# Patient Record
Sex: MALE | Race: BLACK OR AFRICAN AMERICAN | NOT HISPANIC OR LATINO | Employment: UNEMPLOYED | URBAN - METROPOLITAN AREA
[De-identification: names, ages, dates, MRNs, and addresses within clinical notes are randomized per-mention and may not be internally consistent; named-entity substitution may affect disease eponyms.]

---

## 2021-07-06 ENCOUNTER — HOSPITAL ENCOUNTER (EMERGENCY)
Facility: HOSPITAL | Age: 56
Discharge: HOME | End: 2021-07-06
Attending: FAMILY MEDICINE
Payer: MEDICAID

## 2021-07-06 VITALS
BODY MASS INDEX: 39.28 KG/M2 | HEIGHT: 69 IN | TEMPERATURE: 98 F | HEART RATE: 101 BPM | RESPIRATION RATE: 18 BRPM | SYSTOLIC BLOOD PRESSURE: 121 MMHG | DIASTOLIC BLOOD PRESSURE: 67 MMHG | WEIGHT: 265.19 LBS | OXYGEN SATURATION: 97 %

## 2021-07-06 DIAGNOSIS — R53.1 WEAKNESS: ICD-10-CM

## 2021-07-06 DIAGNOSIS — Z71.1 WORRIED WELL: Primary | ICD-10-CM

## 2021-07-06 LAB — POCT GLUCOSE: 291 MG/DL (ref 70–110)

## 2021-07-06 PROCEDURE — 93010 EKG 12-LEAD: ICD-10-PCS | Mod: ,,, | Performed by: INTERNAL MEDICINE

## 2021-07-06 PROCEDURE — 93005 ELECTROCARDIOGRAM TRACING: CPT

## 2021-07-06 PROCEDURE — 99283 EMERGENCY DEPT VISIT LOW MDM: CPT | Mod: 25

## 2021-07-06 PROCEDURE — 82962 GLUCOSE BLOOD TEST: CPT

## 2021-07-06 PROCEDURE — 93010 ELECTROCARDIOGRAM REPORT: CPT | Mod: ,,, | Performed by: INTERNAL MEDICINE

## 2021-07-06 RX ORDER — METHOCARBAMOL 500 MG/1
500 TABLET, FILM COATED ORAL NIGHTLY
COMMUNITY
Start: 2021-05-29

## 2021-07-06 RX ORDER — METFORMIN HYDROCHLORIDE 1000 MG/1
1000 TABLET ORAL
COMMUNITY
Start: 2021-04-23

## 2021-07-06 RX ORDER — LEVOTHYROXINE SODIUM 112 UG/1
1 TABLET ORAL EVERY MORNING
COMMUNITY
Start: 2021-04-23

## 2021-07-06 RX ORDER — LISINOPRIL 20 MG/1
20 TABLET ORAL DAILY
COMMUNITY
Start: 2021-06-17

## 2021-07-06 RX ORDER — MULTIVITAMIN
1 TABLET ORAL DAILY
COMMUNITY

## 2021-07-06 RX ORDER — GABAPENTIN 100 MG/1
100 CAPSULE ORAL 3 TIMES DAILY
Qty: 15 CAPSULE | Refills: 0 | Status: SHIPPED | OUTPATIENT
Start: 2021-07-06 | End: 2021-07-11

## 2021-07-06 RX ORDER — LORATADINE 10 MG/1
10 TABLET ORAL DAILY
COMMUNITY

## 2021-07-06 RX ORDER — FUROSEMIDE 40 MG/1
1 TABLET ORAL 2 TIMES DAILY
COMMUNITY
Start: 2021-04-23 | End: 2022-04-23

## 2021-07-06 RX ORDER — GABAPENTIN 100 MG/1
1 CAPSULE ORAL 3 TIMES DAILY
COMMUNITY
Start: 2021-04-23 | End: 2021-07-06 | Stop reason: SDUPTHER

## 2021-07-06 RX ORDER — ATORVASTATIN CALCIUM 20 MG/1
1 TABLET, FILM COATED ORAL NIGHTLY
COMMUNITY
Start: 2021-04-23 | End: 2021-10-20

## 2023-08-24 ENCOUNTER — TELEPHONE (OUTPATIENT)
Dept: PRIMARY CARE CLINIC | Facility: CLINIC | Age: 58
End: 2023-08-24
Payer: MEDICAID

## 2023-08-24 NOTE — TELEPHONE ENCOUNTER
Returned the call the patient refused the time and availability.      ----- Message from Gerber Stack sent at 8/24/2023 10:56 AM CDT -----  Regarding: Need an appt today  Contact: @ [phone]  Pt is calling to speak to someone in the office; asking to be seen today. No available appts in Epic. Pt is willing to see any provider that is available today. Please call soon. Thanks.         Communication Preference: call back today          Additional Information: Pt is wanting to establish care with a provider and has never been seen at [facility].

## 2025-09-06 ENCOUNTER — OFFICE VISIT (OUTPATIENT)
Dept: URGENT CARE | Facility: CLINIC | Age: 60
End: 2025-09-06
Payer: MEDICAID

## 2025-09-06 VITALS
SYSTOLIC BLOOD PRESSURE: 133 MMHG | HEIGHT: 69 IN | TEMPERATURE: 98 F | HEART RATE: 97 BPM | BODY MASS INDEX: 39.25 KG/M2 | DIASTOLIC BLOOD PRESSURE: 76 MMHG | WEIGHT: 265 LBS | OXYGEN SATURATION: 95 % | RESPIRATION RATE: 18 BRPM

## 2025-09-06 DIAGNOSIS — L03.221 CELLULITIS, NECK: Primary | ICD-10-CM

## 2025-09-06 RX ORDER — DOXYCYCLINE 100 MG/1
100 CAPSULE ORAL 2 TIMES DAILY
Qty: 14 CAPSULE | Refills: 0 | Status: SHIPPED | OUTPATIENT
Start: 2025-09-06 | End: 2025-09-13